# Patient Record
Sex: MALE | URBAN - NONMETROPOLITAN AREA
[De-identification: names, ages, dates, MRNs, and addresses within clinical notes are randomized per-mention and may not be internally consistent; named-entity substitution may affect disease eponyms.]

---

## 2022-04-26 ENCOUNTER — APPOINTMENT (RX ONLY)
Dept: URBAN - NONMETROPOLITAN AREA CLINIC 16 | Facility: CLINIC | Age: 42
Setting detail: DERMATOLOGY
End: 2022-04-26

## 2022-04-26 VITALS — HEIGHT: 69 IN | WEIGHT: 225 LBS

## 2022-04-26 DIAGNOSIS — D49.2 NEOPLASM OF UNSPECIFIED BEHAVIOR OF BONE, SOFT TISSUE, AND SKIN: ICD-10-CM

## 2022-04-26 PROCEDURE — ? FULL BODY SKIN EXAM - DECLINED

## 2022-04-26 PROCEDURE — 11102 TANGNTL BX SKIN SINGLE LES: CPT

## 2022-04-26 PROCEDURE — ? OTHER

## 2022-04-26 PROCEDURE — ? COUNSELING

## 2022-04-26 PROCEDURE — ? BIOPSY BY SHAVE METHOD

## 2022-04-26 ASSESSMENT — LOCATION DETAILED DESCRIPTION DERM: LOCATION DETAILED: RIGHT CENTRAL MALAR CHEEK

## 2022-04-26 ASSESSMENT — LOCATION SIMPLE DESCRIPTION DERM: LOCATION SIMPLE: RIGHT CHEEK

## 2022-04-26 ASSESSMENT — LOCATION ZONE DERM: LOCATION ZONE: FACE

## 2022-04-26 NOTE — PROCEDURE: OTHER
Other (Free Text): Pt and his wife state that lesion was biopsied roughly 10 years ago. They do not have pathology report nor do they recall who performed bx. They state that he has been traveling to [facility] for laser therapy. Following treatment lesion resolves for roughly 2 months but then returns. \\n\\nThey would like lesion surgical removed at this time. We will perform bx to confirm a diagnosis prior to sending referral to possibly Dr. Ryder or consulting with CHARAN or ADRIEL. Other (Free Text): Pt and his wife state that lesion was biopsied roughly 10 years ago. They do not have pathology report nor do they recall who performed bx. They state that he has been traveling to [facility] for laser therapy. Following treatment lesion resolves for roughly 2 months but then returns. \\n\\nThey would like lesion surgical removed at this time. We will perform bx to confirm a diagnosis prior to sending referral to possibly Dr. Ryder or consulting with CHARAN or ADRIEL.